# Patient Record
Sex: MALE | Race: BLACK OR AFRICAN AMERICAN | Employment: OTHER | URBAN - METROPOLITAN AREA
[De-identification: names, ages, dates, MRNs, and addresses within clinical notes are randomized per-mention and may not be internally consistent; named-entity substitution may affect disease eponyms.]

---

## 2021-11-10 ENCOUNTER — TELEPHONE (OUTPATIENT)
Dept: CARDIOLOGY CLINIC | Age: 81
End: 2021-11-10

## 2023-03-28 ENCOUNTER — HOSPITAL ENCOUNTER (OUTPATIENT)
Age: 83
Setting detail: OUTPATIENT SURGERY
End: 2023-03-28
Payer: MEDICARE

## 2023-03-28 RX ORDER — FUROSEMIDE 20 MG/1
20 TABLET ORAL DAILY
COMMUNITY

## 2023-03-28 RX ORDER — PATIROMER 8.4 G/1
8.4 POWDER, FOR SUSPENSION ORAL
COMMUNITY

## 2023-03-28 RX ORDER — AMIODARONE HYDROCHLORIDE 100 MG/1
100 TABLET ORAL DAILY
COMMUNITY

## 2023-03-28 RX ORDER — MULTIVITAMIN
1 TABLET ORAL DAILY
COMMUNITY

## 2023-03-28 RX ORDER — MINOXIDIL 2.5 MG/1
2.5 TABLET ORAL 2 TIMES DAILY
COMMUNITY

## 2023-03-28 RX ORDER — CYCLOSPORINE 0.5 MG/ML
1 EMULSION OPHTHALMIC 2 TIMES DAILY
COMMUNITY

## 2023-03-28 RX ORDER — CALCIUM CARBONATE 200(500)MG
1 TABLET,CHEWABLE ORAL 3 TIMES DAILY
COMMUNITY

## 2023-03-28 NOTE — PERIOP NOTES
PHONE INTERVIEW INITIATED WITH WITH PT'S HENRIQUE [address] AT [facility] (MARIO)   MARIO TO FAX MED LIST TO PAT    PT RESIDES  [facility]. [address] [phone] EXT [phone]. MEDICAL RECORDS, Lindy  [phone] PRESS 0, ASK FOR MEDICAL RECORDS. TRANSPORTATION [place] [phone]  TO BE ARRANGED BY [facility] PER MARIO IN [facility]    UZMALee ZAIDI Jerris Childes [phone]    SPOKE WITH JUSTEN  E Andi Katz [phone] WHO STATED SHE HAS NO NOTES IN CURRENT COMPUTER SYSTEM. CALLED [facility] KIDNEY [facility] 18 [phone] TO  ASKED FOR RECENT LAB RESULTS. NO ANSWER    SACRAL WOUND CURRENTLY BEING TREATED AT [facility], DOCUMENTATION IN NOTES ON CHART. MED LIST AND PT HISTORY RECEIVED FROM [facility] AND ENTERED IN CC.    WOUND CARE NOTES FROM [facility] ON CHART RE:  SACRAL WOUND. POA PAPERS RECEIVED FROM [facility] - ON CHART.     MARIO AT [address] [facility] WILL GET ABAXIBAN INSTRUCTIONS FROM DR Pedro Gonzales

## 2023-03-28 NOTE — PERIOP NOTES
[address] INSTRUCTIONS    Surgery Date:   4/6/23    Your surgeon's office or [facility] staff will call you between 4 PM- 8 PM the day before surgery with your arrival time. If your surgery is on a Monday, you will receive a call the preceding Friday. Please report to [facility] Patient Access/Admitting on the 1st floor. Bring your insurance card, photo identification, and any copayment ( if applicable). If you are going home the same day of your surgery, you must have a responsible adult to drive you home. You need to have a responsible adult to stay with you the first 24 hours after surgery and you should not drive a car for 24 hours following your surgery. Nothing to eat or drink after midnight the night before surgery. This includes no water, gum, mints, coffee, juice, etc.  Please note special instructions, if applicable, below for medications. Do NOT drink alcohol or smoke 24 hours before surgery. STOP smoking for 14 days prior as it helps with breathing and healing after surgery. If you are being admitted to the hospital, please leave personal belongings/luggage in your car until you have an assigned hospital room number. Please wear comfortable clothes. Wear your glasses instead of contacts. We ask that all money, jewelry and valuables be left at home. Wear no make up, particularly mascara, the day of surgery. All body piercings, rings, and jewelry need to be removed and left at home. Please remove any nail polish or artificial nails from your fingernails. Please wear your hair loose or down. Please no pony-tails, buns, or any metal hair accessories. If you shower the morning of surgery, please do not apply any lotions or powders afterwards. You may wear deodorant, unless having breast surgery. Do not shave any body area within 24 hours of your surgery. Please follow all instructions to avoid any potential surgical cancellation.   Should your physical condition change, (i.e. fever, cold, flu, etc.) please notify your surgeon as soon as possible. It is important to be on time. If a situation occurs where you may be delayed, please call:  [phone] / [phone] on the day of surgery. The Preadmission Testing staff can be reached at [phone]. Special instructions: NONE      No current facility-administered medications for this encounter. Current Outpatient Medications   Medication Sig    Oxygen 1L/MIN AS NEEDED FOR SATS <90    amiodarone (PACERONE) 100 mg tablet Take 100 mg by mouth daily. apixaban (ELIQUIS) 2.5 mg tablet Take 2.5 mg by mouth two (2) times a day. fluticasone prp-sod. chl,bicarb 50 mcg- 0.9 % ksps 2 Sprays by Nasal route daily. furosemide (LASIX) 20 mg tablet Take 20 mg by mouth daily. minoxidiL (LONITEN) 2.5 mg tablet Take 2.5 mg by mouth two (2) times a day. multivitamin (ONE A DAY) tablet Take 1 Tablet by mouth daily. cycloSPORINE (RESTASIS) 0.05 % dpet Administer 1 Drop to both eyes two (2) times a day. collagenase (SantyL) 250 unit/gram ointment Apply  to affected area daily. APPLY TO SACRUM    calcium carbonate (TUMS) 200 mg calcium (500 mg) chew Take 1 Tablet by mouth three (3) times daily. patiromer calcium sorbitex (Veltassa) 8.4 gram powder Take 8.4 g by mouth. TUE , THU, SAT , SUN    carbidopa-levodopa (SINEMET)  mg per tablet Take 2.5 Tablets by mouth two (2) times a day. YOU MUST ONLY TAKE THESE MEDICATIONS THE MORNING OF SURGERY WITH A SIP OF WATER: CARBIDOPA-LEVODOPA, FLUTICASONE, AMIODARONE, MINOXIDIL, CYCLOSPORINE  MEDICATIONS TO TAKE THE MORNING OF SURGERY ONLY IF NEEDED: N/A  HOLD these prescription medications BEFORE Surgery: N/A  ASK SURGEON/PRESCRIBER FOR INSTRUCTIONS ON STOPPING/TAKING APAXABAN PRIOR TO SURGERY. PER MARIO AT [facility] DR TOVAR WILL ADVISE ON THIS MED. Stop all vitamins, herbal medicines and Aspirin containing products 7 days prior to surgery.  Stop any non-steroidal anti-inflammatory drugs (i.e. Ibuprofen, Naproxen, Advil, Aleve) 3 days before surgery. You may take Tylenol. If you are currently taking Plavix, Coumadin, or any other blood-thinning/anticoagulant medication contact your prescribing physician for instructions. Preventing Infections Before and After - Your Surgery    IMPORTANT INSTRUCTIONS      You play an important role in your health and preparation for surgery. To reduce the germs on your skin you will need to shower with CHG soap (Chorhexidine gluconate 4%) two times before surgery. CHG soap (Hibiclens, Hex-A-Clens or store brand)  CHG soap will be provided at your Preadmission Testing (PAT) appointment. If you do not have a PAT appointment before surgery, you may arrange to  CHG soap from our office or purchase CHG soap at a pharmacy, grocery or department store. You need to purchase TWO 4 ounce bottles to use for your 2 showers. Steps to follow:  Leela Ibis your hair with your normal shampoo and your body with regular soap and rinse well to remove shampoo and soap from your skin. Wet a clean washcloth and turn off the shower. Put CHG soap on washcloth and apply to your entire body from the neck down. Do not use on your head, face or private parts(genitals). Do not use CHG soap on open sores, wounds or areas of skin irritation. Wash you body gently for 5 minutes. Do not wash your skin too hard. This soap does not create lather. Pay special attention to your underarms and from your belly button to your feet. Turn the shower back on and rinse well to get CHG soap off your body. Pat your skin dry with a clean, dry towel. Do not apply lotions or moisturizer. Put on clean clothes and sleep on fresh bed sheets and do not allow pets to sleep with you.     Shower with CHG soap 2 times before your surgery  The evening before your surgery  The morning of your surgery      Tips to help prevent infections after your surgery:  Protect your surgical wound from germs:  Hand washing is the most important thing you and your caregivers can do to prevent infections. Keep your bandage clean and dry! Do not touch your surgical wound. Use clean, freshly washed towels and washcloths every time you shower; do not share bath linens with others. Until your surgical wound is healed, wear clothing and sleep on bed linens each day that are clean. Do not allow pets to sleep in your bed with you or touch your surgical wound. Do not smoke - smoking delays wound healing. This may be a good time to stop smoking. If you have diabetes, it is important for you to manage your blood sugar levels properly before your surgery as well as after your surgery. Poorly managed blood sugar levels slow down wound healing and prevent you from healing completely. Patient Information Regarding COVID Restrictions    Day of Procedure    Please park in the parking deck or any designated visitor parking lot. Enter the facility through the Main Entrance of the hospital.  On the day of surgery, please provide the cell phone number of the person who will be waiting for you to the Patient Access representative at the time of registration. Masks are highly recommended in the hospital, but not required. Once your procedure and the immediate recovery period is completed, a nurse in the recovery area will contact your designated visitor to inform them of your room number or to otherwise review other pertinent information regarding your care. Social distancing practices are strongly encouraged in waiting areas and the cafeteria.        PREOP INSTRUCTIONS FAXED TO Leonela Dyson AT [address] [place] [address] FAX # [phone]

## 2023-04-05 ENCOUNTER — ANESTHESIA EVENT (OUTPATIENT)
Dept: SURGERY | Age: 83
End: 2023-04-05
Payer: MEDICARE

## 2023-04-05 RX ORDER — ROPIVACAINE HYDROCHLORIDE 5 MG/ML
30 INJECTION, SOLUTION EPIDURAL; INFILTRATION; PERINEURAL AS NEEDED
Start: 2023-04-05

## 2023-04-05 RX ORDER — ALBUTEROL SULFATE 0.83 MG/ML
2.5 SOLUTION RESPIRATORY (INHALATION) AS NEEDED
Start: 2023-04-05

## 2023-04-05 RX ORDER — ONDANSETRON 2 MG/ML
4 INJECTION INTRAMUSCULAR; INTRAVENOUS AS NEEDED
Start: 2023-04-05

## 2023-04-05 RX ORDER — SODIUM CHLORIDE 9 MG/ML
25 INJECTION, SOLUTION INTRAVENOUS CONTINUOUS
Start: 2023-04-05 | End: 2023-04-06

## 2023-04-05 RX ORDER — ACETAMINOPHEN 325 MG/1
650 TABLET ORAL ONCE
Start: 2023-04-05 | End: 2023-04-05

## 2023-04-05 RX ORDER — SODIUM CHLORIDE, SODIUM LACTATE, POTASSIUM CHLORIDE, CALCIUM CHLORIDE 600; 310; 30; 20 MG/100ML; MG/100ML; MG/100ML; MG/100ML
1000 INJECTION, SOLUTION INTRAVENOUS CONTINUOUS
Start: 2023-04-05

## 2023-04-05 RX ORDER — HYDROCODONE BITARTRATE AND ACETAMINOPHEN 5; 325 MG/1; MG/1
1 TABLET ORAL AS NEEDED
Start: 2023-04-05

## 2023-04-05 RX ORDER — MIDAZOLAM HYDROCHLORIDE 1 MG/ML
1 INJECTION, SOLUTION INTRAMUSCULAR; INTRAVENOUS AS NEEDED
Start: 2023-04-05

## 2023-04-05 RX ORDER — FENTANYL CITRATE 50 UG/ML
50 INJECTION, SOLUTION INTRAMUSCULAR; INTRAVENOUS AS NEEDED
Start: 2023-04-05

## 2023-04-05 RX ORDER — DIPHENHYDRAMINE HYDROCHLORIDE 50 MG/ML
12.5 INJECTION, SOLUTION INTRAMUSCULAR; INTRAVENOUS AS NEEDED
Start: 2023-04-05 | End: 2023-04-05

## 2023-04-05 RX ORDER — HYDROMORPHONE HYDROCHLORIDE 1 MG/ML
0.2 INJECTION, SOLUTION INTRAMUSCULAR; INTRAVENOUS; SUBCUTANEOUS
Start: 2023-04-05

## 2023-04-05 RX ORDER — FENTANYL CITRATE 50 UG/ML
25 INJECTION, SOLUTION INTRAMUSCULAR; INTRAVENOUS
Start: 2023-04-05

## 2023-04-05 RX ORDER — GLYCOPYRROLATE 0.2 MG/ML
0.2 INJECTION INTRAMUSCULAR; INTRAVENOUS
Start: 2023-04-05 | End: 2023-04-06

## 2023-04-05 RX ORDER — SODIUM CHLORIDE 9 MG/ML
25 INJECTION, SOLUTION INTRAVENOUS CONTINUOUS
Start: 2023-04-05

## 2023-04-05 RX ORDER — MIDAZOLAM HYDROCHLORIDE 1 MG/ML
0.5 INJECTION, SOLUTION INTRAMUSCULAR; INTRAVENOUS
Start: 2023-04-05

## 2023-04-05 RX ORDER — MORPHINE SULFATE 2 MG/ML
2 INJECTION, SOLUTION INTRAMUSCULAR; INTRAVENOUS
Start: 2023-04-05

## 2023-04-05 RX ORDER — LIDOCAINE HYDROCHLORIDE 10 MG/ML
0.1 INJECTION, SOLUTION EPIDURAL; INFILTRATION; INTRACAUDAL; PERINEURAL AS NEEDED
Start: 2023-04-05

## 2023-04-05 RX ORDER — SODIUM CHLORIDE, SODIUM LACTATE, POTASSIUM CHLORIDE, CALCIUM CHLORIDE 600; 310; 30; 20 MG/100ML; MG/100ML; MG/100ML; MG/100ML
1000 INJECTION, SOLUTION INTRAVENOUS CONTINUOUS
Start: 2023-04-05 | End: 2023-04-06

## 2023-04-06 ENCOUNTER — ANESTHESIA (OUTPATIENT)
Dept: SURGERY | Age: 83
End: 2023-04-06
Payer: MEDICARE

## 2023-04-06 PROCEDURE — 74011250636 HC RX REV CODE- 250/636

## 2023-04-06 PROCEDURE — 77030042556 HC PNCL CAUT -B

## 2023-04-06 PROCEDURE — 77030040361 HC SLV COMPR DVT MDII -B

## 2023-04-06 PROCEDURE — 74011000250 HC RX REV CODE- 250

## 2023-04-06 PROCEDURE — 74011250636 HC RX REV CODE- 250/636: Performed by: ORTHOPAEDIC SURGERY

## 2023-04-06 PROCEDURE — 74011250636 HC RX REV CODE- 250/636: Performed by: ANESTHESIOLOGY

## 2023-04-06 PROCEDURE — C1768 GRAFT, VASCULAR: HCPCS

## 2023-04-06 PROCEDURE — 2709999900 HC NON-CHARGEABLE SUPPLY

## 2023-04-06 PROCEDURE — 76010000149 HC OR TIME 1 TO 1.5 HR

## 2023-04-06 PROCEDURE — 77030031139 HC SUT VCRL2 J&J -A

## 2023-04-06 PROCEDURE — 76210000000 HC OR PH I REC 2 TO 2.5 HR

## 2023-04-06 PROCEDURE — 76060000033 HC ANESTHESIA 1 TO 1.5 HR

## 2023-04-06 DEVICE — PROPATEN VASCULAR GRAFT SW 4-7MMX45CM TAPERED HEPARIN
Type: IMPLANTABLE DEVICE | Site: ARM | Status: FUNCTIONAL
Brand: GORE PROPATEN VASCULAR GRAFT

## 2023-04-06 RX ADMIN — Medication 120 MCG: at 11:00

## 2023-04-06 RX ADMIN — ONDANSETRON HYDROCHLORIDE 4 MG: 2 INJECTION, SOLUTION INTRAMUSCULAR; INTRAVENOUS at 10:56

## 2023-04-06 RX ADMIN — Medication 120 MCG: at 11:06

## 2023-04-06 RX ADMIN — SODIUM CHLORIDE 25 ML/HR: 9 INJECTION, SOLUTION INTRAVENOUS at 10:27

## 2023-04-06 RX ADMIN — PROPOFOL 30 MCG/KG/MIN: 10 INJECTION, EMULSION INTRAVENOUS at 10:40

## 2023-04-06 RX ADMIN — EPHEDRINE SULFATE 10 MG: 50 INJECTION INTRAVENOUS at 11:18

## 2023-04-06 RX ADMIN — MEPIVACAINE HYDROCHLORIDE 30 ML: 15 INJECTION, SOLUTION EPIDURAL; INFILTRATION at 10:35

## 2023-04-06 RX ADMIN — HEPARIN SODIUM 2000 UNITS: 1000 INJECTION INTRAVENOUS; SUBCUTANEOUS at 13:41

## 2023-04-06 RX ADMIN — DEXMEDETOMIDINE HYDROCHLORIDE 8 MCG: 100 INJECTION, SOLUTION, CONCENTRATE INTRAVENOUS at 10:43

## 2023-04-06 RX ADMIN — Medication 120 MCG: at 11:09

## 2023-04-06 RX ADMIN — MIDAZOLAM 2 MG: 1 INJECTION INTRAMUSCULAR; INTRAVENOUS at 10:20

## 2023-04-06 RX ADMIN — WATER 2 G: 1 INJECTION INTRAMUSCULAR; INTRAVENOUS; SUBCUTANEOUS at 10:43

## 2023-04-06 RX ADMIN — EPHEDRINE SULFATE 10 MG: 50 INJECTION INTRAVENOUS at 11:28

## 2023-04-06 RX ADMIN — FENTANYL CITRATE 100 MCG: 50 INJECTION, SOLUTION INTRAMUSCULAR; INTRAVENOUS at 10:26

## 2023-04-06 RX ADMIN — Medication 120 MCG: at 10:55

## 2023-04-06 RX ADMIN — PHENYLEPHRINE HYDROCHLORIDE 20 MCG/MIN: 10 INJECTION INTRAVENOUS at 11:06

## 2023-04-06 NOTE — ANESTHESIA POSTPROCEDURE EVALUATION
Post-Anesthesia Evaluation and Assessment    Patient: Laura Zelaya MRN: [id number]  SSN: [id number]    YOB: 1940  Age: 80 y.o. Sex: male      I have evaluated the patient and they are stable and ready for discharge from the PACU. Cardiovascular Function/Vital Signs  Visit Vitals  BP (!) 115/48   Pulse 78   Temp 36.4 °C (97.6 °F)   Resp 15   Ht 4' 9\" (1.448 m)   Wt 59 kg (130 lb)   SpO2 91%   BMI 28.13 kg/m²       Patient is status post Regional anesthesia for Procedure(s):  INSERTION LEFT UPPER ARM AV GRAFT. Nausea/Vomiting: None    Postoperative hydration reviewed and adequate. Pain:  Pain Scale 1: Numeric (0 - 10) (04/06/23 1300)  Pain Intensity 1: 0 (04/06/23 1300)   Managed    Neurological Status:   Neuro  Neurologic State: Alert (04/06/23 1300)  LUE Motor Response: Purposeful;Weak (04/06/23 1300)  LLE Motor Response: Weak;Purposeful;Pharmacologically paralyzed (04/06/23 1300)  RUE Motor Response: Purposeful;Weak (04/06/23 1300)  RLE Motor Response: Purposeful;Weak (04/06/23 1300)   At baseline    Mental Status, Level of Consciousness: Alert and  oriented to person, place, and time    Pulmonary Status:   O2 Device: None (Room air) (04/06/23 1300)   Adequate oxygenation and airway patent    Complications related to anesthesia: None    Post-anesthesia assessment completed. No concerns    Signed By: Dimitri Ngo DO     April 6, 2023                Procedure(s):  INSERTION LEFT UPPER ARM AV GRAFT.    regional, MAC    <BSHSIANPOST>    INITIAL Post-op Vital signs:   Vitals Value Taken Time   /48 04/06/23 1315   Temp 36.4 °C (97.6 °F) 04/06/23 1300   Pulse 79 04/06/23 1328   Resp 17 04/06/23 1328   SpO2 97 % 04/06/23 1328   Vitals shown include unvalidated device data.

## 2023-04-06 NOTE — BRIEF OP NOTE
Brief Postoperative Note    Patient: Vipin Jenkins  YOB: 1940  MRN: [id number]    Date of Procedure: 4/6/2023     Pre-Op Diagnosis: END STAGE RENAL    Post-Op Diagnosis: Same as preoperative diagnosis. Procedure(s):  INSERTION LEFT UPPER ARM AV GRAFT    Surgeon(s):  Wil Bryan MD    Surgical Assistant: Surg Asst-1: Karen Jones    Anesthesia: Regional     Estimated Blood Loss (mL): Minimal    Complications: None    Specimens: * No specimens in log *     Implants:   Implant Name Type Inv.  Item Serial No.  Lot No. LRB No. Used Action   GRAFT VASC L45CM DIA4-7MM PTFE CBAS HEP SURF STD WALLED - [id number]  GRAFT VASC L45CM DIA4-7MM PTFE CBAS HEP SURF STD WALLED [id number]  [employer]WD NA Left 1 Implanted       Drains: * No LDAs found *    Findings: none    Electronically Signed by Alfonso Marquez MD on 4/6/2023 at 11:40 AM

## 2023-04-06 NOTE — DISCHARGE INSTRUCTIONS
Patient Discharge Instructions    Claudia Davidson / [id number] : 1940    Admitted 2023 Discharged: 2023     Take Home Medications     . It is important that you take the medication exactly as they are prescribed. Keep your medication in the bottles provided by the pharmacist and keep a list of the medication names, dosages, and times to be taken in your wallet. Do not take other medications without consulting your doctor. What to do at Home    Recommended diet: Renal Diet,     Recommended activity: Activity as tolerated,     Additional Instructions: remove left  arm dressing on Sat and leave open    Follow-up with Dr Bakari Guzman in 2 weeks, call [phone] for appt    ______________________________________________________________________    Anesthesia Discharge Instructions    After general anesthesia or intervenous sedation, for 24 hours or while taking prescription Narcotics:  Limit your activities  Do not drive or operate hazardous machinery  If you have not urinated within 8 hours after discharge, please contact your surgeon on call. Do not make important personal or business decisions  Do not drink alcoholic beverages    Report the following to your surgeon:  Excessive pain, swelling, redness or odor of or around the surgical area  Temperature over 100.5 degrees  Nausea and vomiting lasting longer than 4 hours or if unable to take medication  Any signs of decreased circulation or nerve impairment to extremity:  Change in color, persistent numbness, tingling, coldness or increased pain. Any questions         Hemodialysis Access Surgery: What to Expect at [address] [place] [address]  Hemodialysis is a way to remove wastes from the blood when your kidneys can no longer do the job. It's not a cure, but it can help you live longer and feel better. It's a lifesaving treatment when you have kidney failure. Hemodialysis is often called dialysis.  Your doctor created a place (called an access) in your arm for your blood to flow in and out of your body during your dialysis sessions. Your arm will probably be bruised and swollen. It may hurt. The cut (incision) may bleed. The pain and bleeding will get better over several days. You will probably need only over-the-counter pain medicine. You can reduce swelling by propping up your arm on 1 or 2 pillows and keeping your elbow straight. You will have stitches. These may dissolve on their own, or your doctor will tell you when to come in to have them removed. You should also be able to return to work in a few days. You may feel some coolness or numbness in your hand. These feelings usually go away in a few weeks. Your doctor may suggest squeezing a soft object. This will strengthen your access and may make hemodialysis faster and easier. You should always be able to feel blood rushing through the fistula or graft. It feels like a slight vibration when you put your fingers on the skin over the fistula or graft. This feeling is called a thrill or a pulse. This care sheet gives you a general idea about how long it will take for you to recover. But each person recovers at a different pace. Follow the steps below to get better as quickly as possible. How can you care for yourself at home? Activity    Rest when you feel tired. Getting enough sleep will help you recover. Do not lie on or sleep on the arm with the access. Avoid activities such as washing windows or gardening that put stress on the arm with the access. You may use your arm, but do not lift anything that weighs more than about 15 pounds. This may include a child, heavy grocery bags, a heavy briefcase or backpack, cat litter or dog food bags, or a vacuum . You can shower, but keep the access dry for the first 2 days. Cover the area with a plastic bag to keep it dry. Do not soak or scrub the incision until it has healed.      Wear an arm guard to protect the area if you play sports or work with your arms. You may drive when your doctor says it is okay. This is usually in 1 to 2 days. Most people are able to return to work about 1 or 2 days after surgery. Diet    Follow an eating plan that is good for your kidneys. A registered dietitian can help you make a meal plan that is right for you. You may need to limit protein, salt, fluids, and certain foods. Medicines    Your doctor will tell you if and when you can restart your medicines. You will also be given instructions about taking any new medicines. If you stopped taking aspirin or some other blood thinner, your doctor will tell you when to start taking it again. Take pain medicines exactly as directed. If the doctor gave you a prescription medicine for pain, take it as prescribed. If you are not taking a prescription pain medicine, ask your doctor if you can take acetaminophen (Tylenol). Do not take ibuprofen (Advil, Motrin) or naproxen (Aleve), or similar medicines, unless your doctor tells you to. They may make chronic kidney disease worse. Do not take two or more pain medicines at the same time unless the doctor told you to. Many pain medicines have acetaminophen, which is Tylenol. Too much acetaminophen (Tylenol) can be harmful. If you think your pain medicine is making you sick to your stomach: Take your medicine after meals (unless your doctor has told you not to). Ask your doctor for a different pain medicine. If your doctor prescribed antibiotics, take them as directed. Do not stop taking them just because you feel better. You need to take the full course of antibiotics. Incision care    Keep the area dry for 2 days. After 2 days, wash the area with soap and water every day, and always before dialysis. Do not soak or scrub the incision until it has healed. If you have a bandage, change it every day or as your doctor recommends. Your doctor will tell you when you can remove it.    Exercise Squeeze a soft ball or other object as your doctor tells you. This will help blood flow through the access and help prevent blood clots. Elevation    Prop up the sore arm on a pillow anytime you sit or lie down during the next 3 days. Try to keep it above the level of your heart. This will help reduce swelling. Other instructions    Every day, check your access for a pulse or thrill in the fistula or graft area. A thrill is a vibration. To feel a pulse or thrill, place the first two fingers of your hand over the access. Do not bump your arm. Do not wear tight clothing, jewelry, or anything else that may squeeze the access. Use your other arm to have blood drawn or blood pressure taken. Do not put cream or lotion on or near the access. Make sure all doctors you deal with know that you have a vascular access. Follow-up care is a key part of your treatment and safety. Be sure to make and go to all appointments, and call your doctor if you are having problems. It's also a good idea to know your test results and keep a list of the medicines you take. When should you call for help? Call 911 anytime you think you may need emergency care. For example, call if:    You passed out (lost consciousness). You have chest pain, are short of breath, or cough up blood. Call your doctor now or seek immediate medical care if:    Your hand or arm is cold or dark-colored. You have no pulse in your access. You have nausea or you vomit for more than four hours. You have pain that does not get better after you take pain medicine. You have loose stitches, or your incision comes open. You are bleeding from the incision. You have signs of infection, such as: Increased pain, swelling, warmth, or redness. Red streaks leading from the area. Pus draining from the area. A fever.      You have signs of a blood clot in your leg (called a deep vein thrombosis), such as:  Pain in your calf, back of the knee, thigh, or groin. Redness or swelling in your leg. Watch closely for changes in your health, and be sure to contact your doctor if you have any problems. Where can you learn more? Go to http://www.gray.com/  Enter P616 in the search box to learn more about \"Hemodialysis Access Surgery: What to Expect at Home. \"  Current as of: May 4, 2022               Content Version: 13.6  © 2006-2023 [place] [employer]. Care instructions adapted under license by [employer] (which disclaims liability or warranty for this information). If you have questions about a medical condition or this instruction, always ask your healthcare professional. [place] [address] any warranty or liability for your use of this information. Information obtained by :  I understand that if any problems occur once I am at home I am to contact my physician. I understand and acknowledge receipt of the instructions indicated above.                                                                                                                                            Physician's or R.N.'s Signature                                                                  Date/Time                                                                                                                                              Patient or Representative Signature                                                          Date/Time

## 2023-04-06 NOTE — PERIOP NOTES
Discharge instructions given to Trinity at [facility].  Questions answered and understanding voiced

## 2023-04-06 NOTE — PERIOP NOTES
Dialysis catheter on left subclavian  Venous line accessed for iv fluids in pre op  Fluids still infusing

## 2023-04-06 NOTE — ANESTHESIA PREPROCEDURE EVALUATION
Relevant Problems   No relevant active problems       Anesthetic History   No history of anesthetic complications            Review of Systems / Medical History  Patient summary reviewed, nursing notes reviewed and pertinent labs reviewed    Pulmonary  Within defined limits                 Neuro/Psych         Neuromuscular disease and dementia    Comments: Severe Parkinsosns Cardiovascular    Hypertension        Dysrhythmias : atrial fibrillation      Exercise tolerance: <4 METS     GI/Hepatic/Renal         Renal disease: ESRD and dialysis       Endo/Other    Diabetes: type 2    Arthritis and anemia     Other Findings              Physical Exam    Airway  Mallampati: II  TM Distance: 4 - 6 cm  Neck ROM: decreased range of motion   Mouth opening: Diminished (comment)     Cardiovascular    Rhythm: irregular           Dental    Dentition: Poor dentition     Pulmonary  Breath sounds clear to auscultation               Abdominal  GI exam deferred       Other Findings            Anesthetic Plan    ASA: 4  Anesthesia type: regional - interscalene block          Induction: Intravenous  Anesthetic plan and risks discussed with: Patient

## 2023-04-06 NOTE — OP NOTES
[address]   OPERATIVE REPORT    Name:  Donato Duque  MR#:  [id number]  :  1940  ACCOUNT #:  [de-identified]  DATE OF SERVICE:  2023    PREOPERATIVE DIAGNOSIS:  Renal failure. POSTOPERATIVE DIAGNOSIS:  Renal failure. PROCEDURE PERFORMED:  Insertion of left upper arm [place]-Dionicio dialysis graft. SURGEON:  Charles Morales MD    ASSISTANT:  Baron Gerber. ANESTHESIA:  Regional block. COMPLICATIONS:  None. SPECIMENS REMOVED:  None. IMPLANTS:  4-7 mm tapered [place]-Dionicio graft. ESTIMATED BLOOD LOSS:  Minimal.    INDICATIONS:  The patient is an 55-year-old male with end-stage renal disease, on hemodialysis. He has had no previous access procedures in his arms. Preoperative vein mapping showed inadequate veins for native fistula. He will have an arm graft placed. PROCEDURE:  The patient's left arm was prepped and draped. A longitudinal incision was made in the proximal medial upper arm. The brachial artery and brachial vein were dissected free. A 4-7 mm tapered [place]-Dionicio graft was tunneled in a loop configuration in the upper arm using three counter incisions. The arterial side was medial.  The graft was sewn end-to-side to the brachial vein and end-to-side to the brachial artery at the 4 mm end using running CV-5 [place]-Dionicio suture. At completion, there was a palpable thrill in the graft. The incisions were closed with Vicryl subcutaneous suture and skin staples. Dressings were applied and the patient was returned to the recovery room in stable condition.       Mitesh Way MD      [id number]  D:  2023 11:44  T:  2023 13:30  JOB #:  [id number]

## 2023-04-06 NOTE — ANESTHESIA PROCEDURE NOTES
Peripheral Block    Start time: 4/6/2023 10:20 AM  End time: 4/6/2023 10:35 AM  Performed by: Yadi Marrero MD  Authorized by: Yadi Marrero MD       Pre-procedure: Indications: at surgeon's request, post-op pain management, procedure for pain and primary anesthetic    Preanesthetic Checklist: patient identified, risks and benefits discussed, site marked, timeout performed, anesthesia consent given and patient being monitored    Timeout Time: 10:20 EDT      Block Type:   Block Type:   Interscalene  Laterality:  Left  Monitoring:  Standard ASA monitoring, continuous pulse ox, frequent vital sign checks, heart rate, oxygen and responsive to questions  Injection Technique:  Single shot  Procedures: nerve stimulator    Patient Position: supine  Prep: chlorhexidine    Location:  Interscalene  Needle Type:  Stimuplex  Needle Gauge:  22 G  Needle Localization:  Anatomical landmarks and nerve stimulator  Motor Response comment:   Motor Response: minimal motor response >0.4 mA    Medication Injected:  Mepivacaine PF (CARBOCAINE) 1.5 % injection - Peripheral Nerve Block   30 mL - 4/6/2023 10:35:00 AM  Med Admin Time: 4/6/2023 10:35 AM    Assessment:  Number of attempts:  1  Injection Assessment:  Incremental injection every 5 mL, negative aspiration for CSF, no paresthesia, negative aspiration for blood and no intravascular symptoms  Patient tolerance:  Patient tolerated the procedure well with no immediate complications

## 2023-07-27 ENCOUNTER — HOSPITAL ENCOUNTER (OUTPATIENT)
Facility: HOSPITAL | Age: 83
Setting detail: SPECIMEN
Discharge: HOME | End: 2023-07-30

## 2023-07-27 LAB — POTASSIUM SERPL-SCNC: 5.6 MMOL/L (ref 3.5–5.1)

## 2023-07-27 PROCEDURE — 84132 ASSAY OF SERUM POTASSIUM: CPT

## 2023-07-27 PROCEDURE — 36415 COLL VENOUS BLD VENIPUNCTURE: CPT

## (undated) LAB
ANION GAP BLD CALC-SCNC: 5 MMOL/L (ref 10–20)
CA-I BLD-MCNC: 1.05 MMOL/L (ref 1.12–1.32)
CHLORIDE BLD-SCNC: 101 MMOL/L (ref 98–107)
CO2 BLD-SCNC: 32.4 MMOL/L (ref 21–32)
CREAT BLD-MCNC: 3.37 MG/DL (ref 0.6–1.3)
GLUCOSE BLD STRIP.AUTO-MCNC: 101 MG/DL (ref 65–117)
GLUCOSE BLD-MCNC: 111 MG/DL (ref 65–100)
POTASSIUM BLD-SCNC: 4.4 MMOL/L (ref 3.5–5.1)
SERVICE CMNT-IMP: (no result)
SERVICE CMNT-IMP: (no result)
SODIUM BLD-SCNC: 137 MMOL/L (ref 136–145)

## (undated) DEVICE — GLOVE ORANGE PI 7   MSG9070

## (undated) DEVICE — GARMENT,MEDLINE,DVT,INT,CALF,MED, GEN2: Brand: MEDLINE

## (undated) DEVICE — SOL INJ SOD CL 0.9% 500ML BG --

## (undated) DEVICE — SUTURE VCRL SZ 3-0 L27IN ABSRB UD L26MM SH 1/2 CIR J416H

## (undated) DEVICE — SOLUTION IRRIG 1000ML STRL H2O USP PLAS POUR BTL

## (undated) DEVICE — PENCIL SMK EVAC 10 FT BLADE ELECTRD ROCKER FOR TELSCP

## (undated) DEVICE — Device

## (undated) DEVICE — HANDLE LT SNAP ON ULT DURABLE LENS FOR TRUMPF ALC DISPOSABLE

## (undated) DEVICE — SPONGE GZ W4XL4IN COT 12 PLY TYP VII WVN C FLD DSGN STERILE